# Patient Record
Sex: FEMALE | Race: WHITE | NOT HISPANIC OR LATINO | URBAN - METROPOLITAN AREA
[De-identification: names, ages, dates, MRNs, and addresses within clinical notes are randomized per-mention and may not be internally consistent; named-entity substitution may affect disease eponyms.]

---

## 2023-09-26 VITALS
SYSTOLIC BLOOD PRESSURE: 121 MMHG | RESPIRATION RATE: 11 BRPM | HEART RATE: 90 BPM | RESPIRATION RATE: 11 BRPM | HEART RATE: 77 BPM | DIASTOLIC BLOOD PRESSURE: 81 MMHG | SYSTOLIC BLOOD PRESSURE: 109 MMHG | OXYGEN SATURATION: 99 % | HEART RATE: 89 BPM | OXYGEN SATURATION: 98 % | RESPIRATION RATE: 11 BRPM | DIASTOLIC BLOOD PRESSURE: 70 MMHG | DIASTOLIC BLOOD PRESSURE: 73 MMHG | HEIGHT: 71 IN | HEART RATE: 75 BPM | DIASTOLIC BLOOD PRESSURE: 79 MMHG | SYSTOLIC BLOOD PRESSURE: 103 MMHG | SYSTOLIC BLOOD PRESSURE: 165 MMHG | SYSTOLIC BLOOD PRESSURE: 105 MMHG | SYSTOLIC BLOOD PRESSURE: 119 MMHG | SYSTOLIC BLOOD PRESSURE: 121 MMHG | SYSTOLIC BLOOD PRESSURE: 109 MMHG | DIASTOLIC BLOOD PRESSURE: 66 MMHG | RESPIRATION RATE: 17 BRPM | RESPIRATION RATE: 16 BRPM | DIASTOLIC BLOOD PRESSURE: 61 MMHG | HEART RATE: 69 BPM | DIASTOLIC BLOOD PRESSURE: 69 MMHG | HEART RATE: 89 BPM | DIASTOLIC BLOOD PRESSURE: 56 MMHG | SYSTOLIC BLOOD PRESSURE: 133 MMHG | HEART RATE: 81 BPM | RESPIRATION RATE: 7 BRPM | OXYGEN SATURATION: 97 % | HEART RATE: 89 BPM | RESPIRATION RATE: 7 BRPM | HEART RATE: 77 BPM | WEIGHT: 150 LBS | DIASTOLIC BLOOD PRESSURE: 83 MMHG | SYSTOLIC BLOOD PRESSURE: 101 MMHG | SYSTOLIC BLOOD PRESSURE: 104 MMHG | OXYGEN SATURATION: 100 % | RESPIRATION RATE: 17 BRPM | HEART RATE: 62 BPM | HEART RATE: 87 BPM | SYSTOLIC BLOOD PRESSURE: 123 MMHG | SYSTOLIC BLOOD PRESSURE: 119 MMHG | HEART RATE: 58 BPM | HEART RATE: 58 BPM | DIASTOLIC BLOOD PRESSURE: 101 MMHG | HEART RATE: 81 BPM | OXYGEN SATURATION: 98 % | OXYGEN SATURATION: 100 % | DIASTOLIC BLOOD PRESSURE: 65 MMHG | HEART RATE: 58 BPM | HEART RATE: 90 BPM | HEART RATE: 92 BPM | HEART RATE: 79 BPM | HEART RATE: 78 BPM | DIASTOLIC BLOOD PRESSURE: 101 MMHG | HEIGHT: 71 IN | DIASTOLIC BLOOD PRESSURE: 83 MMHG | SYSTOLIC BLOOD PRESSURE: 123 MMHG | DIASTOLIC BLOOD PRESSURE: 84 MMHG | RESPIRATION RATE: 9 BRPM | HEART RATE: 78 BPM | RESPIRATION RATE: 10 BRPM | HEART RATE: 80 BPM | DIASTOLIC BLOOD PRESSURE: 83 MMHG | SYSTOLIC BLOOD PRESSURE: 119 MMHG | RESPIRATION RATE: 5 BRPM | SYSTOLIC BLOOD PRESSURE: 109 MMHG | SYSTOLIC BLOOD PRESSURE: 104 MMHG | DIASTOLIC BLOOD PRESSURE: 73 MMHG | HEART RATE: 75 BPM | RESPIRATION RATE: 17 BRPM | DIASTOLIC BLOOD PRESSURE: 57 MMHG | DIASTOLIC BLOOD PRESSURE: 73 MMHG | HEART RATE: 62 BPM | HEART RATE: 78 BPM | DIASTOLIC BLOOD PRESSURE: 84 MMHG | DIASTOLIC BLOOD PRESSURE: 57 MMHG | DIASTOLIC BLOOD PRESSURE: 61 MMHG | DIASTOLIC BLOOD PRESSURE: 79 MMHG | HEART RATE: 79 BPM | HEART RATE: 80 BPM | HEART RATE: 92 BPM | DIASTOLIC BLOOD PRESSURE: 101 MMHG | SYSTOLIC BLOOD PRESSURE: 105 MMHG | DIASTOLIC BLOOD PRESSURE: 61 MMHG | HEART RATE: 75 BPM | DIASTOLIC BLOOD PRESSURE: 81 MMHG | HEART RATE: 87 BPM | HEART RATE: 76 BPM | RESPIRATION RATE: 16 BRPM | WEIGHT: 150 LBS | RESPIRATION RATE: 10 BRPM | RESPIRATION RATE: 10 BRPM | DIASTOLIC BLOOD PRESSURE: 58 MMHG | DIASTOLIC BLOOD PRESSURE: 56 MMHG | RESPIRATION RATE: 1 BRPM | SYSTOLIC BLOOD PRESSURE: 165 MMHG | SYSTOLIC BLOOD PRESSURE: 105 MMHG | HEART RATE: 92 BPM | RESPIRATION RATE: 5 BRPM | DIASTOLIC BLOOD PRESSURE: 79 MMHG | OXYGEN SATURATION: 99 % | DIASTOLIC BLOOD PRESSURE: 57 MMHG | SYSTOLIC BLOOD PRESSURE: 129 MMHG | RESPIRATION RATE: 1 BRPM | OXYGEN SATURATION: 100 % | SYSTOLIC BLOOD PRESSURE: 129 MMHG | SYSTOLIC BLOOD PRESSURE: 133 MMHG | RESPIRATION RATE: 16 BRPM | HEART RATE: 69 BPM | HEART RATE: 90 BPM | HEART RATE: 87 BPM | HEIGHT: 71 IN | SYSTOLIC BLOOD PRESSURE: 101 MMHG | OXYGEN SATURATION: 99 % | HEART RATE: 62 BPM | DIASTOLIC BLOOD PRESSURE: 65 MMHG | DIASTOLIC BLOOD PRESSURE: 58 MMHG | RESPIRATION RATE: 5 BRPM | SYSTOLIC BLOOD PRESSURE: 165 MMHG | HEART RATE: 77 BPM | SYSTOLIC BLOOD PRESSURE: 103 MMHG | HEART RATE: 80 BPM | SYSTOLIC BLOOD PRESSURE: 103 MMHG | DIASTOLIC BLOOD PRESSURE: 84 MMHG | HEART RATE: 81 BPM | OXYGEN SATURATION: 97 % | DIASTOLIC BLOOD PRESSURE: 70 MMHG | DIASTOLIC BLOOD PRESSURE: 69 MMHG | OXYGEN SATURATION: 98 % | OXYGEN SATURATION: 97 % | RESPIRATION RATE: 9 BRPM | DIASTOLIC BLOOD PRESSURE: 66 MMHG | DIASTOLIC BLOOD PRESSURE: 66 MMHG | SYSTOLIC BLOOD PRESSURE: 121 MMHG | SYSTOLIC BLOOD PRESSURE: 123 MMHG | HEART RATE: 76 BPM | SYSTOLIC BLOOD PRESSURE: 132 MMHG | DIASTOLIC BLOOD PRESSURE: 58 MMHG | HEART RATE: 76 BPM | SYSTOLIC BLOOD PRESSURE: 104 MMHG | WEIGHT: 150 LBS | DIASTOLIC BLOOD PRESSURE: 81 MMHG | SYSTOLIC BLOOD PRESSURE: 132 MMHG | TEMPERATURE: 98 F | DIASTOLIC BLOOD PRESSURE: 56 MMHG | SYSTOLIC BLOOD PRESSURE: 132 MMHG | RESPIRATION RATE: 9 BRPM | DIASTOLIC BLOOD PRESSURE: 70 MMHG | SYSTOLIC BLOOD PRESSURE: 133 MMHG | HEART RATE: 79 BPM | TEMPERATURE: 98 F | SYSTOLIC BLOOD PRESSURE: 101 MMHG | RESPIRATION RATE: 7 BRPM | HEART RATE: 69 BPM | DIASTOLIC BLOOD PRESSURE: 69 MMHG | DIASTOLIC BLOOD PRESSURE: 65 MMHG | TEMPERATURE: 98 F | RESPIRATION RATE: 1 BRPM | SYSTOLIC BLOOD PRESSURE: 129 MMHG

## 2023-09-29 ENCOUNTER — OFFICE (OUTPATIENT)
Dept: URBAN - METROPOLITAN AREA PATHOLOGY 2 | Facility: PATHOLOGY | Age: 48
End: 2023-09-29
Payer: COMMERCIAL

## 2023-09-29 ENCOUNTER — AMBULATORY SURGICAL CENTER (OUTPATIENT)
Dept: URBAN - METROPOLITAN AREA SURGERY 12 | Facility: SURGERY | Age: 48
End: 2023-09-29

## 2023-09-29 DIAGNOSIS — K64.8 OTHER HEMORRHOIDS: ICD-10-CM

## 2023-09-29 DIAGNOSIS — Z86.010 PERSONAL HISTORY OF COLONIC POLYPS: ICD-10-CM

## 2023-09-29 DIAGNOSIS — Z09 ENCOUNTER FOR FOLLOW-UP EXAMINATION AFTER COMPLETED TREATMEN: ICD-10-CM

## 2023-09-29 DIAGNOSIS — D12.8 BENIGN NEOPLASM OF RECTUM: ICD-10-CM

## 2023-09-29 PROBLEM — K62.1 RECTAL POLYP: Status: ACTIVE | Noted: 2023-09-29

## 2023-09-29 PROCEDURE — 88305 TISSUE EXAM BY PATHOLOGIST: CPT | Performed by: PATHOLOGY

## 2023-09-29 PROCEDURE — 45380 COLONOSCOPY AND BIOPSY: CPT | Performed by: INTERNAL MEDICINE

## 2023-10-23 NOTE — PROGRESS NOTES
HPI    Nancy Dalton is a 48 y.o. female who previously underwent TAE of a rectal polyp with Dr. Currie on 3/8/2022. Path demonstrating TA. She most recently underwent colonoscopy on 9/29/23 with Dr. Hunter. There was evidence of a distal rectal polyp adjacent to the dentate line, biopsied. Path TA.     She has an external hemorrhoid that causes her pain. She has intermittent constipation. No urgency or incontinence. No bleeding. Her hemorrhoid has been bothering her since the colonoscopy. She has discomfort and difficulties with hygiene because of the hemorrhoid. She moves her bowels 1-2 times per day. She does not strain. Spends minimal time on the toilet.     Colonoscopy 9/29/23 ([facility]): Complete to the cecum. There was evidence of a distal rectal polyp adjacent to the dentate line, biopsied. Path TA. There was evidence of scarring in the distal rectum from prior polypectomy. Internal hemorrhoids were noted.     Non-smoker/No ETOH/No Illicit drug use  No family history of CRC or IBD    Past Medical History:   Diagnosis Date    Personal history of diseases of the blood and blood-forming organs and certain disorders involving the immune mechanism 02/11/2022    History of anemia       Past Surgical History:   Procedure Laterality Date    OTHER SURGICAL HISTORY  02/11/2022    Tonsillectomy    OTHER SURGICAL HISTORY  09/02/2022    Rectal polypectomy       Allergies: Not on File        Physical Exam  Resolving R sided thrombosed external hemorrhoid  FRANCY tone normal, squeeze normal    Anoscopy    Date/Time: 10/26/2023 11:26 AM    Performed by: Gisell Viramontes MD  Authorized by: Gisell Viramontes MD    Consent:     Consent obtained:  Verbal    Consent given by:  Patient    Risks, benefits, and alternatives were discussed: yes      Risks discussed:  Bleeding and pain  Universal protocol:     Procedure explained and questions answered to patient or proxy's satisfaction: yes      Patient identity  confirmed:  Verbally with patient  Post-procedure details:     Procedure completion:  Tolerated well, no immediate complications  Comments:      Resolving thrombosed external right lateral hemorrhoid. Right anterolateral rectal polyp.       Assessment and Plan:     Recurrent R anterior polyp at dentate line; on my exam, it is about 4-5mm in size. Probably a recurrence given location.   Resolving external thrombosed hemorroid on same side.   Plan for transanal excision.     The diagnosis, anatomy, pathophysiology, and staging of polyps, cancer in a polyp and colorectal cancer were discussed. We discussed the findings, indications for surgery, treatment goals, procedure of transanal excision and alternatives. We discussed the perioperative course including preparation, avoidance of coagulopathy inducing medications, hospital arrival, procedure details, discharge and recovery.     We discussed the risks specific to TAE including, but were not limited to, inability to remove the lesion, bleeding, infection, wound separation, peritonitis (abdominal infection), stenosis (narrowing of the rectum), sphincter weakness and incontinence, fistula to the skin (or vagina in women), difficulty urinating after the procedure, and recurrence of the lesion. Further treatment recommendations will be based on the final pathology. The general risks of surgery discussed included, but were not limited to, infection (abdomen, wound, bladder, lungs, etc), transfusion (hepatitis, HIV), deep vein thrombosis-DVT (leg clots), pulmonary embolism-PE (lung clots), hernia, heart problems (heart attack, CHF & arrhythmia), lung problems (pneumonia, aspiration, atelectasis), stroke.     We also talked about the hemorrhoid - I think its worth waiting until it resolves, and if she has a residual skin tag that still bothers her and is difficult to clean, we could excise at hte same time. This would significantly add to the postop pain and recovery. We  talked about this in detail. If it is not substantially better in 3 weeks, I asked her to call me. I recommend fiber, fluid, and heat in the meantime.     Patient education materials were provided. All questions were answered. Informed consent was obtained.

## 2023-10-25 PROBLEM — D64.9 ANEMIA: Status: ACTIVE | Noted: 2023-10-25

## 2023-10-25 PROBLEM — A69.20 LYME DISEASE: Status: ACTIVE | Noted: 2023-10-25

## 2023-10-25 PROBLEM — K63.5 COLON POLYP: Status: ACTIVE | Noted: 2023-10-25

## 2023-10-25 RX ORDER — ERGOCALCIFEROL 1.25 MG/1
1 CAPSULE ORAL
COMMUNITY
Start: 2022-10-26 | End: 2023-10-26 | Stop reason: ALTCHOICE

## 2023-10-25 RX ORDER — FERROUS SULFATE 325(65) MG
TABLET ORAL
COMMUNITY

## 2023-10-25 RX ORDER — ACETAMINOPHEN 500 MG
TABLET ORAL
COMMUNITY

## 2023-10-26 ENCOUNTER — OFFICE VISIT (OUTPATIENT)
Dept: SURGERY | Facility: CLINIC | Age: 48
End: 2023-10-26
Payer: COMMERCIAL

## 2023-10-26 ENCOUNTER — PREP FOR PROCEDURE (OUTPATIENT)
Dept: GYNECOLOGIC ONCOLOGY | Facility: HOSPITAL | Age: 48
End: 2023-10-26

## 2023-10-26 VITALS
WEIGHT: 146 LBS | HEART RATE: 128 BPM | SYSTOLIC BLOOD PRESSURE: 139 MMHG | BODY MASS INDEX: 20.44 KG/M2 | DIASTOLIC BLOOD PRESSURE: 96 MMHG | HEIGHT: 71 IN

## 2023-10-26 DIAGNOSIS — K62.1 RECTAL POLYP: Primary | ICD-10-CM

## 2023-10-26 PROCEDURE — 99214 OFFICE O/P EST MOD 30 MIN: CPT | Performed by: SURGERY

## 2023-10-26 PROCEDURE — 46600 DIAGNOSTIC ANOSCOPY SPX: CPT | Performed by: SURGERY

## 2023-10-27 ENCOUNTER — APPOINTMENT (OUTPATIENT)
Dept: SURGERY | Facility: CLINIC | Age: 48
End: 2023-10-27
Payer: COMMERCIAL

## 2023-12-01 ENCOUNTER — OFFICE (OUTPATIENT)
Dept: URBAN - METROPOLITAN AREA CLINIC 27 | Facility: CLINIC | Age: 48
End: 2023-12-01

## 2023-12-01 VITALS — WEIGHT: 147 LBS | HEIGHT: 71 IN | TEMPERATURE: 97.7 F

## 2023-12-01 DIAGNOSIS — Z09 ENCOUNTER FOR FOLLOW-UP EXAMINATION AFTER COMPLETED TREATMEN: ICD-10-CM

## 2023-12-01 DIAGNOSIS — Z86.010 PERSONAL HISTORY OF COLONIC POLYPS: ICD-10-CM

## 2023-12-01 PROCEDURE — 99213 OFFICE O/P EST LOW 20 MIN: CPT | Performed by: INTERNAL MEDICINE

## 2024-01-08 NOTE — CPM/PAT H&P
CPM/PAT Evaluation       Name: Nancy Dalton (Nancy Dalton)  /Age: 1975/48 y.o.     TELEMEDICINE ENCOUNTER  Patient was contacted by telephone for preadmission testing perioperative risk assessment prior to surgery.    CHIEF COMPLAINT  Rectal polyp    HPI  Nacny Dalton is a 48-year-old female complaining of rectal polyp that was visualized under colonoscopy on 2023.  Patient with prior history of rectal polyp underwent TAE on 2022 with Dr. Currie.  Patient also states she has an external hemorrhoid that is tender/painful.  She denies any associated bleeding, and endorses intermittent constipation.  Patient scheduled for excision of rectum lesion on 2024 at [facility].     ACTIVE PROBLEMS  Patient Active Problem List   Diagnosis    Anemia    Colon polyp    Lyme disease    Rectal polyp     PAST MEDICAL HISTORY  Past Medical History:   Diagnosis Date    Personal history of diseases of the blood and blood-forming organs and certain disorders involving the immune mechanism 2022    History of anemia     SURGICAL HISTORY  Past Surgical History:   Procedure Laterality Date    COLONOSCOPY      OTHER SURGICAL HISTORY  2022    Tonsillectomy    OTHER SURGICAL HISTORY  2022    Rectal polypectomy    UPPER GASTROINTESTINAL ENDOSCOPY      WISDOM TOOTH EXTRACTION       ANESTHESIA HISTORY  Patient states she had PONV after tonsillectomy surgery as a child, but denies any problems with adult anesthesia such as prolonged sedation, awareness, dental damage, aspiration, cardiac arrest, difficult intubation, or unexpected hospital admissions  Denies family history of malignant hyperthermia, or pseudocholinesterase deficiency.    SOCIAL HISTORY  Never smoker; EtOH: About 2 to 3 glasses of wine a week; denies recreational drug use  Patient states she exercises 2-3 times a week, and is able to do moderate ADLs such as heavy housework, light yard work.  Patient denies history of chest  pain, GALLOWAY.  METS >4    FAMILY HISTORY  Family History   Problem Relation Name Age of Onset    Other (malignant neoplasm) Mother      Kidney disease Father       ALLERGIES  Allergies   Allergen Reactions    Erythromycin Unknown    Penicillin Hives and Rash     MEDICATIONS  No current facility-administered medications for this encounter.    Current Outpatient Medications:     cholecalciferol (Vitamin D-3) 50 mcg (2,000 unit) capsule, Take by mouth., Disp: , Rfl:     ferrous sulfate 325 (65 Fe) MG tablet, Take by mouth., Disp: , Rfl:     PHYSICAL EXAM  Deferred    AIRWAY EXAM  Deferred    VITALS  No vitals taken for telemedicine visit  Height: 5 feet 11 inches; weight: 146 pounds; BMI: 20.36    LABS  Recent Data from [facility]  Related to CBC + DIFF  Component 10/20/23 04/24/23 10/07/21 09/29/20 08/03/20 01/17/20   WBC 4.6 5.0 5.76 4.39 3.60 Low  5.70   RBC 4.17 4.63 4.27 3.98 4.40 4.38   Hemoglobin 12.4 11.4 10.9 Low  10.8 Low  11.9 11.1 Low    Hematocrit 36.5 36.2 37.0 34.1 Low  38.4 36.7   MCV 88 78 Low  86.7 85.7 87.3 83.8   MCH 29.7 24.6 Low  25.5 Low  27.1 27.0 25.3 Low    MCHC 34.0 31.5 29.5 Low  31.7 31.0 30.2 Low    RDW-CV 13.5 13.4 14.3 13.2 13.6 15.1 High    Platelet Count 336 364 320 316 318 359   Neut% 52 46 60.4 48.7 40.5 55.5   Lymph% 36 41 26.2 40.1 48.9 31.9   Mono% 7 7 9.9 7.3 5.8 7.5   Eosin% 4 5 2.8 3.2 4.2 4.0   Baso% 1 1 0.7 0.7 0.6 1.1   Neutrophil Ab 2.4 2.3 -- -- -- --   Lymphocytes, Absolute 1.7 2.1 -- -- -- --   Abs Mono 0.3 0.3 0.57 0.32 0.21 0.43   Abs Eosin 0.2 0.2 0.16 0.14 0.15 0.23   Abs Baso 0.0 0.0 0.04 0.03 <0.03 0.06   Immature Granulocytes 0 0 -- -- -- --   IMMATURE GRANS (ABS) 0.0 0.0 -- -- -- --        Recent Data from [facility]  Related to COMP METABOLIC PANEL  Component 10/20/23 10/07/21 08/03/20 05/09/18 12/10/05 11/29/05   Glucose 88 92  91  90  119 High  --   BUN 15 11 12 12 15 --   Creatinine 0.91 0.73 0.81 0.75 0.80 0.70   EGFR 77 -- -- -- -- --   BUN/Creat  Ratio 16 -- -- -- -- --   Sodium 139 141 140 139 133 Low  --   Potassium 4.3 4.4 5.0 4.3 3.9 --   Chloride 101 109 High  105 101 97 Low  --   CO2 24 23 24 26 22 --   Calcium 9.4 9.0 9.5 9.3 8.3 Low  --   Protein, Total 6.7 6.7 6.9 7.0 -- --   Albumin 4.9 4.3 4.6 4.6 -- --   Globulin, Total 1.8 -- -- -- -- --   A/G Ratio (Electro) 2.7 High  -- -- -- -- --   Bilirubin, Total 1.4 High  0.7 1.1 1.6 High  -- --   Alkaline Phosphatase 44 47 41 41 -- --   AST 13 18 16 16 -- --   ALT 10 10 8 10 -- --   Anion Gap -- 9 11 12 14.0 --   eGFR- -- >60 >60 >60 -- --   eGFR-All Other Races -- >60  >60  >60  -- --   View all related data       IMAGING  EKG 10/24/2022  Impression    NSR, NL Axis, normal Pwave morphology, Q waves III and F, possible inferior infarct age undetermined. No acute ST-T changes and no prior injury. Abnormal EKG  Specimen Collected: -- Last Resulted: 10/24/22   Received From: [facility]  Result Received: 10/26/23 11:08        ASSESSMENT/PLAN  Rectal polyp  Excision of rectum lesion      This note was created in part upon personal review of patient's medical records.

## 2024-01-08 NOTE — PREPROCEDURE INSTRUCTIONS
Pre-Op Instructions & Checklist  Your surgery has been scheduled at [facility] at [address] [place], [address], [address], in the [facility]. Parking is to the left of the main entrance.  You will be contacted about the time of your surgery the day before your surgery. If you are unable to answer the phone, a detailed voicemail message will be left. Make sure that your voicemail box is not full so a message can be left. If you have not received a call by 3:00 pm you may call [phone] between the hours of 3:00 and 4:00 pm. Please be available by phone the night before/day of surgery in case there is a change in the schedule which may require you to arrive earlier/later.  14 DAYS BEFORE SURGERY STOP TAKING WEIGHT LOSS MEDICATIONS     7 DAYS BEFORE SURGERY STOP THESE MEDICATIONS:  Multiple Vitamins containing Vitamin E  Herbal supplements, Fish Oil, garlic pills, turmeric, CoQ enzyme  Stop taking aspirin, and aspirin-containing products as well as NSAID's such as Advil, Motrin, Aleve, Ibuprofen. Tylenol is okay to take for pain relief.   If you are currently taking Coumadin/Warfarin, we will have to coordinate that with your PCP &/or the Anticoagulation Clinic.       THE DAY BEFORE SURGERY:  *Do not eat any food after midnight the night before surgery.   *You are permitted to have clear liquids such as water, apple juice, plain tea or coffee (no milk or creamer), clear electrolyte-replenishing drinks such as Pedialyte, Gatorade, or Powerade (not yogurt or pulp-containing smoothies or juices such as orange juice) up to 2 hours before your surgery.    DAY OF SURGERY, TAKE THESE MEDICATIONS with a small sip of water (if it is not listed, do not take it):    There are no medications for you to take the morning of surgery.     ON THE MORNING OF SURGERY:  *Shower either the night before your surgery or the morning of your surgery  *Do not use moisturizers, creams, lotions or perfume,  or make-up.  *Wear comfortable, loose fitting clothing.   *All jewelry and valuables should be left at home.  *Prosthetic devices such as contact lenses, hearing aids, dentures, eyelash extensions, hairpins and body piercing must be removed before surgery. Bring containers for eyeglasses/contacts, dentures, or hearing aids with you.  Diabetics: Please check fasting blood sugars upon waking up.  If fasting blood sugars are<80ml/dl, please drink 3 ounces of apple juice no later than 2 hours prior to surgery.    BRING WITH YOU:  *Photo ID and insurance card  *Current list of medicines and allergies  *Pacemaker/Defibrillator/Heart stent cards  *Copy of your complete Advanced Directive/DHPOA-if applicable      SMOKING:  *Quitting smoking can make a huge difference to your health and recovery from surgery.    *If you need help with quitting, call 1-252-QUIT-NOW.    Alcohol:  *No alcoholic beverages for 48 hours before surgery.    AFTER OUTPATIENT SURGERY:  *A responsible adult MUST accompany you at the time of discharge and stay with you for 24 hours after your surgery.  *You may NOT drive yourself home after surgery.  *You may use a taxi or ride sharing service (Lyft, Uber) to return home ONLY if you are accompanied by a friend or family member.  *Instructions for resuming your medications will be provided by your surgeon.    CONTACT SURGEON'S OFFICE IF YOU DEVELOP:  * Fever =/> 100.4 F   * New respiratory symptoms (e.g. cough, shortness of breath, respiratory distress, sore throat)  * Recent loss of taste or smell  *Flu like symptoms such as headache, fatigue or gastrointestinal symptoms  * If you develop any open sores, shingles, burning or painful urination   AND/OR:  * You no longer wish to have the surgery.  * Any other personal circumstances change that may lead to the need to cancel or defer this surgery.  *You were admitted to any hospital within one week of your planned procedure.      If you have any questions  regarding these preoperative instructions you may call [phone]. If you have questions regarding you surgical procedure, or post-operative care/recovery please call your surgeon's office.     Link to [facility]  [email or web address]

## 2024-01-18 ENCOUNTER — ANESTHESIA EVENT (OUTPATIENT)
Dept: OPERATING ROOM | Facility: CLINIC | Age: 49
End: 2024-01-18
Payer: COMMERCIAL

## 2024-01-19 ENCOUNTER — ANESTHESIA (OUTPATIENT)
Dept: OPERATING ROOM | Facility: CLINIC | Age: 49
End: 2024-01-19
Payer: COMMERCIAL

## 2024-01-19 ENCOUNTER — HOSPITAL ENCOUNTER (OUTPATIENT)
Facility: CLINIC | Age: 49
Setting detail: OUTPATIENT SURGERY
Discharge: HOME | End: 2024-01-19
Attending: SURGERY | Admitting: SURGERY
Payer: COMMERCIAL

## 2024-01-19 VITALS
OXYGEN SATURATION: 96 % | WEIGHT: 148.15 LBS | BODY MASS INDEX: 20.74 KG/M2 | HEART RATE: 72 BPM | RESPIRATION RATE: 16 BRPM | DIASTOLIC BLOOD PRESSURE: 93 MMHG | SYSTOLIC BLOOD PRESSURE: 161 MMHG | HEIGHT: 71 IN | TEMPERATURE: 97.9 F

## 2024-01-19 DIAGNOSIS — K62.1 RECTAL POLYP: Primary | ICD-10-CM

## 2024-01-19 PROCEDURE — 2500000004 HC RX 250 GENERAL PHARMACY W/ HCPCS (ALT 636 FOR OP/ED): Performed by: ANESTHESIOLOGY

## 2024-01-19 PROCEDURE — A4217 STERILE WATER/SALINE, 500 ML: HCPCS | Performed by: SURGERY

## 2024-01-19 PROCEDURE — A45172 PR EXCIS RECTAL TUMOR, TRANSANAL, FULL THICKNESS: Performed by: REGISTERED NURSE

## 2024-01-19 PROCEDURE — 7100000001 HC RECOVERY ROOM TIME - INITIAL BASE CHARGE: Performed by: SURGERY

## 2024-01-19 PROCEDURE — A45172 PR EXCIS RECTAL TUMOR, TRANSANAL, FULL THICKNESS: Performed by: ANESTHESIOLOGY

## 2024-01-19 PROCEDURE — 3600000003 HC OR TIME - INITIAL BASE CHARGE - PROCEDURE LEVEL THREE: Performed by: SURGERY

## 2024-01-19 PROCEDURE — 2500000005 HC RX 250 GENERAL PHARMACY W/O HCPCS: Performed by: REGISTERED NURSE

## 2024-01-19 PROCEDURE — 2500000005 HC RX 250 GENERAL PHARMACY W/O HCPCS: Performed by: SURGERY

## 2024-01-19 PROCEDURE — 45172 EXC RECT TUM TRANSANAL FULL: CPT | Performed by: SURGERY

## 2024-01-19 PROCEDURE — 2500000004 HC RX 250 GENERAL PHARMACY W/ HCPCS (ALT 636 FOR OP/ED): Performed by: STUDENT IN AN ORGANIZED HEALTH CARE EDUCATION/TRAINING PROGRAM

## 2024-01-19 PROCEDURE — 2500000004 HC RX 250 GENERAL PHARMACY W/ HCPCS (ALT 636 FOR OP/ED): Performed by: SURGERY

## 2024-01-19 PROCEDURE — 7100000009 HC PHASE TWO TIME - INITIAL BASE CHARGE: Performed by: SURGERY

## 2024-01-19 PROCEDURE — 3700000001 HC GENERAL ANESTHESIA TIME - INITIAL BASE CHARGE: Performed by: SURGERY

## 2024-01-19 PROCEDURE — 3700000002 HC GENERAL ANESTHESIA TIME - EACH INCREMENTAL 1 MINUTE: Performed by: SURGERY

## 2024-01-19 PROCEDURE — 2500000004 HC RX 250 GENERAL PHARMACY W/ HCPCS (ALT 636 FOR OP/ED): Performed by: REGISTERED NURSE

## 2024-01-19 PROCEDURE — 88305 TISSUE EXAM BY PATHOLOGIST: CPT | Performed by: STUDENT IN AN ORGANIZED HEALTH CARE EDUCATION/TRAINING PROGRAM

## 2024-01-19 PROCEDURE — 7100000002 HC RECOVERY ROOM TIME - EACH INCREMENTAL 1 MINUTE: Performed by: SURGERY

## 2024-01-19 PROCEDURE — 3600000008 HC OR TIME - EACH INCREMENTAL 1 MINUTE - PROCEDURE LEVEL THREE: Performed by: SURGERY

## 2024-01-19 PROCEDURE — 88305 TISSUE EXAM BY PATHOLOGIST: CPT | Mod: TC,SUR | Performed by: SURGERY

## 2024-01-19 PROCEDURE — 7100000010 HC PHASE TWO TIME - EACH INCREMENTAL 1 MINUTE: Performed by: SURGERY

## 2024-01-19 RX ORDER — SODIUM CHLORIDE, SODIUM LACTATE, POTASSIUM CHLORIDE, CALCIUM CHLORIDE 600; 310; 30; 20 MG/100ML; MG/100ML; MG/100ML; MG/100ML
100 INJECTION, SOLUTION INTRAVENOUS CONTINUOUS
Status: DISCONTINUED | OUTPATIENT
Start: 2024-01-19 | End: 2024-01-19 | Stop reason: HOSPADM

## 2024-01-19 RX ORDER — ALBUTEROL SULFATE 0.83 MG/ML
2.5 SOLUTION RESPIRATORY (INHALATION) ONCE AS NEEDED
Status: DISCONTINUED | OUTPATIENT
Start: 2024-01-19 | End: 2024-01-19 | Stop reason: HOSPADM

## 2024-01-19 RX ORDER — ACETAMINOPHEN 325 MG/1
650 TABLET ORAL EVERY 4 HOURS PRN
Status: DISCONTINUED | OUTPATIENT
Start: 2024-01-19 | End: 2024-01-19 | Stop reason: HOSPADM

## 2024-01-19 RX ORDER — FENTANYL CITRATE 50 UG/ML
25 INJECTION, SOLUTION INTRAMUSCULAR; INTRAVENOUS EVERY 5 MIN PRN
Status: DISCONTINUED | OUTPATIENT
Start: 2024-01-19 | End: 2024-01-19 | Stop reason: HOSPADM

## 2024-01-19 RX ORDER — HYDRALAZINE HYDROCHLORIDE 20 MG/ML
5 INJECTION INTRAMUSCULAR; INTRAVENOUS EVERY 30 MIN PRN
Status: DISCONTINUED | OUTPATIENT
Start: 2024-01-19 | End: 2024-01-19 | Stop reason: HOSPADM

## 2024-01-19 RX ORDER — FENTANYL CITRATE 50 UG/ML
50 INJECTION, SOLUTION INTRAMUSCULAR; INTRAVENOUS EVERY 5 MIN PRN
Status: DISCONTINUED | OUTPATIENT
Start: 2024-01-19 | End: 2024-01-19 | Stop reason: HOSPADM

## 2024-01-19 RX ORDER — SODIUM CHLORIDE 0.9 G/100ML
IRRIGANT IRRIGATION AS NEEDED
Status: DISCONTINUED | OUTPATIENT
Start: 2024-01-19 | End: 2024-01-19 | Stop reason: HOSPADM

## 2024-01-19 RX ORDER — ROCURONIUM BROMIDE 10 MG/ML
INJECTION, SOLUTION INTRAVENOUS AS NEEDED
Status: DISCONTINUED | OUTPATIENT
Start: 2024-01-19 | End: 2024-01-19

## 2024-01-19 RX ORDER — LIDOCAINE HYDROCHLORIDE 20 MG/ML
INJECTION, SOLUTION INFILTRATION; PERINEURAL AS NEEDED
Status: DISCONTINUED | OUTPATIENT
Start: 2024-01-19 | End: 2024-01-19

## 2024-01-19 RX ORDER — ONDANSETRON HYDROCHLORIDE 2 MG/ML
4 INJECTION, SOLUTION INTRAVENOUS ONCE AS NEEDED
Status: DISCONTINUED | OUTPATIENT
Start: 2024-01-19 | End: 2024-01-19 | Stop reason: HOSPADM

## 2024-01-19 RX ORDER — OXYCODONE HYDROCHLORIDE 5 MG/1
5 TABLET ORAL EVERY 6 HOURS PRN
Qty: 15 TABLET | Refills: 0 | Status: SHIPPED | OUTPATIENT
Start: 2024-01-19 | End: 2024-01-24

## 2024-01-19 RX ORDER — ONDANSETRON HYDROCHLORIDE 2 MG/ML
INJECTION, SOLUTION INTRAVENOUS AS NEEDED
Status: DISCONTINUED | OUTPATIENT
Start: 2024-01-19 | End: 2024-01-19

## 2024-01-19 RX ORDER — PROPOFOL 10 MG/ML
INJECTION, EMULSION INTRAVENOUS AS NEEDED
Status: DISCONTINUED | OUTPATIENT
Start: 2024-01-19 | End: 2024-01-19

## 2024-01-19 RX ORDER — KETOROLAC TROMETHAMINE 30 MG/ML
INJECTION, SOLUTION INTRAMUSCULAR; INTRAVENOUS AS NEEDED
Status: DISCONTINUED | OUTPATIENT
Start: 2024-01-19 | End: 2024-01-19

## 2024-01-19 RX ORDER — DEXAMETHASONE SODIUM PHOSPHATE 4 MG/ML
INJECTION, SOLUTION INTRA-ARTICULAR; INTRALESIONAL; INTRAMUSCULAR; INTRAVENOUS; SOFT TISSUE AS NEEDED
Status: DISCONTINUED | OUTPATIENT
Start: 2024-01-19 | End: 2024-01-19

## 2024-01-19 RX ORDER — FENTANYL CITRATE 50 UG/ML
INJECTION, SOLUTION INTRAMUSCULAR; INTRAVENOUS AS NEEDED
Status: DISCONTINUED | OUTPATIENT
Start: 2024-01-19 | End: 2024-01-19

## 2024-01-19 RX ORDER — BUPIVACAINE HCL/EPINEPHRINE 0.5-1:200K
VIAL (ML) INJECTION AS NEEDED
Status: DISCONTINUED | OUTPATIENT
Start: 2024-01-19 | End: 2024-01-19 | Stop reason: HOSPADM

## 2024-01-19 RX ORDER — MIDAZOLAM HYDROCHLORIDE 1 MG/ML
INJECTION, SOLUTION INTRAMUSCULAR; INTRAVENOUS AS NEEDED
Status: DISCONTINUED | OUTPATIENT
Start: 2024-01-19 | End: 2024-01-19

## 2024-01-19 RX ORDER — OXYCODONE AND ACETAMINOPHEN 5; 325 MG/1; MG/1
1 TABLET ORAL EVERY 4 HOURS PRN
Status: DISCONTINUED | OUTPATIENT
Start: 2024-01-19 | End: 2024-01-19 | Stop reason: HOSPADM

## 2024-01-19 RX ORDER — METRONIDAZOLE 250 MG/1
250 TABLET ORAL 3 TIMES DAILY
Qty: 21 TABLET | Refills: 0 | Status: SHIPPED | OUTPATIENT
Start: 2024-01-19 | End: 2024-01-26

## 2024-01-19 RX ORDER — LABETALOL HYDROCHLORIDE 5 MG/ML
5 INJECTION, SOLUTION INTRAVENOUS ONCE
Status: COMPLETED | OUTPATIENT
Start: 2024-01-19 | End: 2024-01-19

## 2024-01-19 RX ADMIN — LABETALOL HYDROCHLORIDE 5 MG: 5 INJECTION, SOLUTION INTRAVENOUS at 10:30

## 2024-01-19 RX ADMIN — KETOROLAC TROMETHAMINE 30 MG: 30 INJECTION, SOLUTION INTRAMUSCULAR at 08:27

## 2024-01-19 RX ADMIN — SUGAMMADEX 200 MG: 100 INJECTION, SOLUTION INTRAVENOUS at 08:33

## 2024-01-19 RX ADMIN — FENTANYL CITRATE 50 MCG: 50 INJECTION, SOLUTION INTRAMUSCULAR; INTRAVENOUS at 07:48

## 2024-01-19 RX ADMIN — MIDAZOLAM 2 MG: 1 INJECTION INTRAMUSCULAR; INTRAVENOUS at 07:43

## 2024-01-19 RX ADMIN — FENTANYL CITRATE 50 MCG: 50 INJECTION, SOLUTION INTRAMUSCULAR; INTRAVENOUS at 08:05

## 2024-01-19 RX ADMIN — ONDANSETRON 4 MG: 2 INJECTION INTRAMUSCULAR; INTRAVENOUS at 08:01

## 2024-01-19 RX ADMIN — PROPOFOL 50 MG: 10 INJECTION, EMULSION INTRAVENOUS at 08:35

## 2024-01-19 RX ADMIN — SODIUM CHLORIDE, SODIUM LACTATE, POTASSIUM CHLORIDE, AND CALCIUM CHLORIDE 100 ML/HR: .6; .31; .03; .02 INJECTION, SOLUTION INTRAVENOUS at 07:30

## 2024-01-19 RX ADMIN — PROPOFOL 150 MG: 10 INJECTION, EMULSION INTRAVENOUS at 07:48

## 2024-01-19 RX ADMIN — ROCURONIUM BROMIDE 50 MG: 50 INJECTION INTRAVENOUS at 07:49

## 2024-01-19 RX ADMIN — LIDOCAINE HYDROCHLORIDE 60 ML: 20 INJECTION, SOLUTION INFILTRATION; PERINEURAL at 07:48

## 2024-01-19 RX ADMIN — DEXAMETHASONE SODIUM PHOSPHATE 4 MG: 4 INJECTION, SOLUTION INTRAMUSCULAR; INTRAVENOUS at 08:01

## 2024-01-19 ASSESSMENT — PAIN - FUNCTIONAL ASSESSMENT
PAIN_FUNCTIONAL_ASSESSMENT: 0-10

## 2024-01-19 ASSESSMENT — COLUMBIA-SUICIDE SEVERITY RATING SCALE - C-SSRS
2. HAVE YOU ACTUALLY HAD ANY THOUGHTS OF KILLING YOURSELF?: NO
1. IN THE PAST MONTH, HAVE YOU WISHED YOU WERE DEAD OR WISHED YOU COULD GO TO SLEEP AND NOT WAKE UP?: NO
6. HAVE YOU EVER DONE ANYTHING, STARTED TO DO ANYTHING, OR PREPARED TO DO ANYTHING TO END YOUR LIFE?: NO

## 2024-01-19 ASSESSMENT — PAIN SCALES - GENERAL: PAINLEVEL_OUTOF10: 0 - NO PAIN

## 2024-01-19 NOTE — H&P
"History Of Present Illness  Nancy Dalton is a 48 y.o. female presenting with recurrent rectal polyp, R anterolateral position at dentate line.     Past Medical History  Past Medical History:   Diagnosis Date    Personal history of diseases of the blood and blood-forming organs and certain disorders involving the immune mechanism 02/11/2022    History of anemia       Surgical History  Past Surgical History:   Procedure Laterality Date    COLONOSCOPY      OTHER SURGICAL HISTORY  02/11/2022    Tonsillectomy    OTHER SURGICAL HISTORY  09/02/2022    Rectal polypectomy    UPPER GASTROINTESTINAL ENDOSCOPY      WISDOM TOOTH EXTRACTION          Social History  She reports that she has never smoked. She has never used smokeless tobacco. She reports current alcohol use of about 3.0 standard drinks of alcohol per week. She reports that she does not use drugs.    Family History  Family History   Problem Relation Name Age of Onset    Other (malignant neoplasm) Mother      Kidney disease Father          Allergies  Erythromycin and Penicillin         Physical Exam  Breathing comfortably  RRR  NAD     Last Recorded Vitals  Blood pressure 166/84, pulse 88, temperature 36.3 °C (97.3 °F), temperature source Temporal, resp. rate 16, height 1.803 m (5' 11\"), weight 67.2 kg (148 lb 2.4 oz), last menstrual period 01/12/2024, SpO2 100 %.    Relevant Results             Assessment/Plan   Principal Problem:    Rectal polyp      Plan for MARCELA Viramontes MD    "

## 2024-01-19 NOTE — ANESTHESIA PREPROCEDURE EVALUATION
Patient: Nancy Dalton    Procedure Information       Date/Time: 01/19/24 2430    Procedure: Excision Lesion Rectum - plan for transanal excision of rectal polyp in prone position    Location: [facility] OR 04 / Virtual [facility]    Surgeons: Gisell Viramontes MD            Relevant Problems   Anesthesia (within normal limits)      Cardiovascular (within normal limits)  > 4 mets        Endocrine (within normal limits)      GI (within normal limits)      /Renal (within normal limits)      Neuro/Psych (within normal limits)      Pulmonary (within normal limits)      GI/Hepatic (within normal limits)      Hematology   (+) Anemia      Musculoskeletal (within normal limits)      Eyes, Ears, Nose, and Throat (within normal limits)      Infectious Disease   (+) Lyme disease       Clinical information reviewed:   Tobacco  Allergies  Meds   Med Hx  Surg Hx   Fam Hx          NPO Detail:  NPO/Void Status  Date of Last Liquid: 01/18/24  Time of Last Liquid: 1930  Date of Last Solid: 01/18/24  Time of Last Solid: 1930         Physical Exam    Airway  Mallampati: II     Cardiovascular    Dental - normal exam  Comments: Perm retainer top front   Pulmonary    Abdominal        Anesthesia Plan    History of general anesthesia?: yes  History of complications of general anesthesia?: no    ASA 1     general     intravenous induction   Anesthetic plan and risks discussed with patient.    Plan discussed with CAA.

## 2024-01-19 NOTE — OP NOTE
Excision Lesion Rectum Operative Note     Date: 2024  OR Location: [facility] OR    Name: Nancy Dalton : 1975, Age: 48 y.o., MRN: [id number], Sex: female    Diagnosis  Pre-op Diagnosis     * Rectal polyp [K62.1] Post-op Diagnosis     * Rectal polyp [K62.1]     Procedures  Excision Lesion Rectum  11077 - AZ EXC RCT MACI INCL MUSCULARIS PROPRIA      Surgeons      * Gisell Viramontes - Primary    Resident/Fellow/Other Assistant:  Surgeon(s) and Role:    Procedure Summary  Anesthesia: General  ASA: I  Anesthesia Staff: Anesthesiologist: Placido Sebastian DO  CRNA: GUILHERME Nj-CRNA  Estimated Blood Loss: 1mL  Intra-op Medications:   Medication Name Total Dose   BUPivacaine-EPINEPHrine (Marcaine w/EPI) 0.5 %-1:200,000 injection 37 mL   sodium chloride 0.9 % irrigation solution 500 mL   lactated Ringer's infusion 125 mL              Anesthesia Record               Intraprocedure I/O Totals       None           Specimen:   ID Type Source Tests Collected by Time   1 : anal canal polyp Tissue SOFT TISSUE MASS RESECTION SURGICAL PATHOLOGY EXAM Gisell Viramontes MD 2024 0822   2 : anal canal polyp additional margin Tissue SOFT TISSUE MASS RESECTION SURGICAL PATHOLOGY EXAM Gisell Viramontes MD 2024        Staff:   Circulator: Juice Mackay RN  Scrub Person: Myrna Raines         Drains and/or Catheters: * None in log *    Tourniquet Times:         Implants:     Findings: 3mm right anterolateral polyp at dentate line    Indications: Nancy Dalton is an 48 y.o. female who is having surgery for Rectal polyp [K62.1].     The patient was seen in the preoperative area. The risks, benefits, complications, treatment options, non-operative alternatives, expected recovery and outcomes were discussed with the patient. The possibilities of reaction to medication, pulmonary aspiration, injury to surrounding structures, bleeding, recurrent infection, the need for additional  procedures, failure to diagnose a condition, and creating a complication requiring transfusion or operation were discussed with the patient. The patient concurred with the proposed plan, giving informed consent.  The site of surgery was properly noted/marked if necessary per policy. The patient has been actively warmed in preoperative area. Preoperative antibiotics are not indicated. Venous thrombosis prophylaxis have been ordered including bilateral sequential compression devices    Procedure Details:   Procedure:   Informed consent was obtained including discussion of risks, benefits, and alternatives. The patient was brought to the operating room and placed supine. Sequential compression devices were placed. Huddle was completed in accordance with hospital procedures. Anesthesia was induced and ETT was placed. The patient was placed in prone jacknife with all pressure points padded. The area was prepped and draped in the usual fashion. Time out was completed. Digital rectal exam was performed. Pudendal nerve block was performed with 1/4% marcaine with epinephrine. Intersphincteric block  was also performed. A submucosal injection in the area of the lesion was performed to help dissect the layers. A total of 30cc of local anesthesia was utilized.     The  polyp was in the R anterolateral position.  Scar was in anterior midline from previus procedure. An everting suture was placed to improve exposure. [place] Faustin retractor was placed. The rest of the anal canal was normal. The margins of resection were marked out and were at least 5mm from the edge of the polyp. Electrocautery was used to dissect in the submucosal plane, taking care not to include any sphincter muscle but to include mucosa and submucosa.   The remaining defect was similar to a hemorrhoidectomy defect and was closed in that way. A running 2=0 vicryl was used to reapproximate the mucosa, taking some deeper tissue to close the dead space.  The corner was intentionally left open to promote drainage.   The patient was then returned to supine. Anesthesia was weaned and the endotracheal tube was removed. The patient was transferred to PACU awake and in stable conditions. Counts were reported correct at the end of the procedure. I was present and scrubbed throughout.   Complications:  None; patient tolerated the procedure well.    Disposition: PACU - hemodynamically stable.  Condition: stable         Additional Details: none    Attending Attestation:     Gisell Viramontes  Phone Number: [phone]

## 2024-01-19 NOTE — ANESTHESIA PROCEDURE NOTES
Airway  Date/Time: 1/19/2024 7:52 AM  Urgency: elective    Airway not difficult    Staffing  Performed: CRNA   Authorized by: Placido Sebastian DO    Performed by: GUILHERME Nj-ZARINA  Patient location during procedure: OR    Indications and Patient Condition  Indications for airway management: anesthesia  Spontaneous Ventilation: absent  Sedation level: deep  Preoxygenated: yes  Patient position: sniffing  Mask difficulty assessment: 1 - vent by mask  Planned trial extubation    Final Airway Details  Final airway type: endotracheal airway      Successful airway: ETT  Cuffed: yes   Successful intubation technique: direct laryngoscopy  Facilitating devices/methods: intubating stylet  Endotracheal tube insertion site: oral  Blade: Rizwan  Blade size: #4  ETT size (mm): 7.0  Cormack-Lehane Classification: grade IIa - partial view of glottis  Placement verified by: chest auscultation, capnometry and palpation of cuff   Measured from: lips  ETT to lips (cm): 23  Number of attempts at approach: 1

## 2024-01-22 ASSESSMENT — PAIN SCALES - GENERAL: PAINLEVEL_OUTOF10: 2

## 2024-01-25 LAB
LABORATORY COMMENT REPORT: NORMAL
PATH REPORT.FINAL DX SPEC: NORMAL
PATH REPORT.GROSS SPEC: NORMAL
PATH REPORT.RELEVANT HX SPEC: NORMAL
PATH REPORT.TOTAL CANCER: NORMAL

## 2024-03-01 NOTE — PROGRESS NOTES
HPI    Nancy Dalton is a 48 y.o. female who previously underwent TAE of a rectal polyp with Dr. Currie on 3/8/2022. Path demonstrating TA. She most recently underwent colonoscopy on 9/29/23 with Dr. Hunter. There was evidence of a distal rectal polyp adjacent to the dentate line, biopsied. Path TA.     She is s/p EUA with excision of rectal polyp on 1/19/24. Path TA.     Colonoscopy 9/29/23 ([facility]): Complete to the cecum. There was evidence of a distal rectal polyp adjacent to the dentate line, biopsied. Path TA. There was evidence of scarring in the distal rectum from prior polypectomy. Internal hemorrhoids were noted.       Past Medical History:   Diagnosis Date    Personal history of diseases of the blood and blood-forming organs and certain disorders involving the immune mechanism 02/11/2022    History of anemia       Past Surgical History:   Procedure Laterality Date    COLONOSCOPY      OTHER SURGICAL HISTORY  02/11/2022    Tonsillectomy    OTHER SURGICAL HISTORY  09/02/2022    Rectal polypectomy    UPPER GASTROINTESTINAL ENDOSCOPY      WISDOM TOOTH EXTRACTION         Allergies:   Allergies   Allergen Reactions    Erythromycin Unknown    Penicillin Hives and Rash       Review of Systems   All other systems reviewed and are negative.        Physical Exam  Vitals and nursing note reviewed.   Constitutional:       Appearance: Normal appearance. She is normal weight.   Neurological:      Mental Status: She is alert.           Anoscopy    Date/Time: 3/5/2024 11:58 AM    Performed by: Gisell Viramontes MD  Authorized by: Gisell Viramontes MD    Consent:     Consent obtained:  Verbal    Consent given by:  Patient    Risks, benefits, and alternatives were discussed: yes      Risks discussed:  Bleeding and pain  Universal protocol:     Procedure explained and questions answered to patient or proxy's satisfaction: yes      Patient identity confirmed:  Verbally with patient  Indications:     Indications  comment:  Rectal polyp  Post-procedure details:     Procedure completion:  Tolerated well, no immediate complications      Assessment and Plan:       Healing well. Reviewed path. Small scar just above dentate R anterior.   Follow up for re-exam 4 months. Discussed small risk of recurrence.

## 2024-03-05 ENCOUNTER — OFFICE VISIT (OUTPATIENT)
Dept: SURGERY | Facility: CLINIC | Age: 49
End: 2024-03-05
Payer: COMMERCIAL

## 2024-03-05 VITALS
BODY MASS INDEX: 20.22 KG/M2 | WEIGHT: 145 LBS | DIASTOLIC BLOOD PRESSURE: 89 MMHG | HEART RATE: 91 BPM | SYSTOLIC BLOOD PRESSURE: 147 MMHG

## 2024-03-05 DIAGNOSIS — K62.1 RECTAL POLYP: Primary | ICD-10-CM

## 2024-03-05 PROCEDURE — 99213 OFFICE O/P EST LOW 20 MIN: CPT | Performed by: SURGERY

## 2024-03-05 PROCEDURE — 1036F TOBACCO NON-USER: CPT | Performed by: SURGERY

## 2024-03-05 PROCEDURE — 46600 DIAGNOSTIC ANOSCOPY SPX: CPT | Performed by: SURGERY

## 2024-07-08 NOTE — PROGRESS NOTES
HPI    Nancy Dalton is a 48 y.o. female who previously underwent TAE of a rectal polyp with Dr. Currie on 3/8/2022. Path demonstrating TA. She most recently underwent colonoscopy on 9/29/23 with Dr. Hunter. There was evidence of a distal rectal polyp adjacent to the dentate line, biopsied. Path TA.     She is s/p EUA with excision of rectal polyp on 1/19/24. Path TA. At LOV in March she had a small scar just above the dentate line in the right anterior.    Her twin sister was recently diagnosed with stage 4 colon cancer. She has been in and out of the hospital with an abscess and recently had a diverting stoma placed.     Colonoscopy 9/29/23 ([facility]): Complete to the cecum. There was evidence of a distal rectal polyp adjacent to the dentate line, biopsied. Path TA. There was evidence of scarring in the distal rectum from prior polypectomy. Internal hemorrhoids were noted.       Past Medical History:   Diagnosis Date    Personal history of diseases of the blood and blood-forming organs and certain disorders involving the immune mechanism 02/11/2022    History of anemia       Past Surgical History:   Procedure Laterality Date    COLONOSCOPY      OTHER SURGICAL HISTORY  02/11/2022    Tonsillectomy    OTHER SURGICAL HISTORY  09/02/2022    Rectal polypectomy    UPPER GASTROINTESTINAL ENDOSCOPY      WISDOM TOOTH EXTRACTION         Allergies:   Allergies   Allergen Reactions    Erythromycin Unknown    Penicillin Hives and Rash       Review of Systems   All other systems reviewed and are negative.        Physical Exam  Vitals and nursing note reviewed.   Constitutional:       Appearance: Normal appearance. She is normal weight.   Neurological:      Mental Status: She is alert.           Anoscopy    Date/Time: 7/12/2024 11:19 AM    Performed by: Gisell Viramontes MD  Authorized by: Gisell Viramontes MD    Consent:     Consent obtained:  Verbal    Consent given by:  Patient    Risks, benefits, and  alternatives were discussed: yes      Risks discussed:  Bleeding and pain  Universal protocol:     Procedure explained and questions answered to patient or proxy's satisfaction: yes      Patient identity confirmed:  Verbally with patient  Indications:     Indications comment:  Rectal polyp  Post-procedure details:     Procedure completion:  Tolerated well, no immediate complications      Assessment and Plan:     Normal exam today. Discussed her family history in detail. Twin sister now with stage IV CRC; she has a personal history of advanced adenoma. She is not sure her sister will make it to genetics but she would like to go herself.   Follow up with me 6 months for surveillance in office.

## 2024-07-12 ENCOUNTER — OFFICE VISIT (OUTPATIENT)
Dept: SURGERY | Facility: CLINIC | Age: 49
End: 2024-07-12
Payer: COMMERCIAL

## 2024-07-12 VITALS
SYSTOLIC BLOOD PRESSURE: 150 MMHG | BODY MASS INDEX: 20.08 KG/M2 | HEART RATE: 91 BPM | DIASTOLIC BLOOD PRESSURE: 95 MMHG | WEIGHT: 144 LBS

## 2024-07-12 DIAGNOSIS — K62.1 RECTAL POLYP: ICD-10-CM

## 2024-07-12 DIAGNOSIS — Z80.0 FAMILY HISTORY OF COLON CANCER: ICD-10-CM

## 2024-07-12 PROCEDURE — 46600 DIAGNOSTIC ANOSCOPY SPX: CPT | Performed by: SURGERY

## 2024-07-12 PROCEDURE — 99214 OFFICE O/P EST MOD 30 MIN: CPT | Mod: 24 | Performed by: SURGERY

## 2024-07-12 PROCEDURE — 99214 OFFICE O/P EST MOD 30 MIN: CPT | Performed by: SURGERY

## 2024-07-23 NOTE — PROGRESS NOTES
History of Present Illness:  Nancy Dalton is a 48 y.o. female with a family history of colon cancer  Nancy Dalton was referred to the Cancer Genetics Clinic at [facility] by Sekou Viramontes. Nancy Dalton is interested in genetic testing to clarify their personal risk for cancer, as well as the risks to their family members.    Cancer Medical History:  Personal history of cancer? 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 No     Cancer screening history:  Mammograms? Yes, most recent 22   Patient denies personal history of breast biopsy.   PAP smear? Yes, most recent 23 . No h/o abnormal paps  Colonoscopy? Yes:  - 10/5/23 (1 rectal polyp, tubular adenoma)  Total polyp count was 1-2  Upper endoscopy? Yes, most recent ~    Dermatology?  Follows annually    Other cancer screening? None    Reproductive History:  Number of children: 2  Number of pregnancies: 2  Age first birth: 28  Breast feeding? Yes , 6 mo  Menarche (age): 12  Menopause (age): 48  OCP: Yes , 3 years  HRT: No   Breast tissue: extremely dense   Hysterectomy? No   Oophorectomy? No     Family history:  A 4-generation pedigree was obtained and was significant for the following:   Fraternal twin sister (living, 50) with recently diagnosed colon cancer (stage IV, unsure whether she has had genetic testing or not). She also has a h/o melanoma at 34 and maybe some sort of female cancer but Ms. Dalton is unsure  Brother (living, 47) without cancer history  Mother (, 72) who passed of cholangiocarcinoma diagnosed at 72 (no genetic testing)  Maternal uncle (living, 80) with a h/o prostate cancer at 77 (unsure if had genetic testing)  Maternal aunt (, 72) who passed of liver cancer diagnosed at 70. (No genetic testing)  Maternal grandmother (, mid 80s) without cancer history  Maternal grandfather (, 53) without cancer history  Father (, 32) without  cancer history (passed d/t complications of kidney failure)  Paternal aunt (living, 77/78) without cancer history  Both paternal grandparents lived into their 70s without cancer history  Maternal ancestry is Kosovan.  Paternal ancestry is Italian. There is no known Ashkenazi Holiness ancestry. Consanguinity was denied.       Discussion:  Nancy Dalton is a 48 y.o. old female with a family history of cancer.  Based on having >=3 first-degree or second-degree relatives with Espino Syndrome-related cancers (such as colon and biliary tract cancers, bile and liver) regardless of age (who aren't available for their own testing), Nancy Dalton meets NCCN criteria for testing of the Espino syndrome genes. She is interested in testing, which is recommended, and was ordered today via the 71-gene panel from [employer] [facility] [employer]. Our discussion is summarized below.    We reviewed genes and chromosomes and inherited forms of colon and breast cancer. We discussed that most cancers are not due to an inherited genetic susceptibility. However, in about 5-10% of families, there is an inherited genetic mutation that can make a person more susceptible to developing certain forms of cancer. Within these families, we often see multiple family members with cancer, occurring in multiple generations. In addition, earlier onset cancers are suggestive of an inherited form of cancer. Finally, there is a clustering of certain types of cancer in these families.    We discussed Espino syndrome which is caused by germline mutations in one of five genes - MLH1, MSH2, MSH6, PMS2, and EPCAM. Individuals with Espino syndrome have increased risk to develop colon cancer over their lifetime, and an increased risk for a second colon primary. Women with Espino syndrome have an increased risk for endometrial cancer and ovarian cancer. Other cancers associated with Espino syndrome include gastric cancer, hepatobiliary, small bowel, urinary tract, and rarely  pancreatic cancer. Understanding if an individual has this condition results in changes to their medical management such as more frequent colonoscopies.    We discussed that there are multiple genes associated with increased cancer risk. Some genes, like the Espino Syndrome genes, are considered highly penetrant cancer genes, meaning a mutation in the gene confers a high risk of cancer. Additionally, there are other intermediate (moderate risk) cancer genes. For some of the moderate risk genes, there is often limited information regarding the degree to which a mutation in the gene affects risk of different types of cancers. Additionally, for some of these moderate risk genes, the appropriate management for individuals who have a mutation in one of these genes is not always clear. Our knowledge about the cancer risks associated with mutations in these moderate risk genes is always growing, and we will likely be able to provide more comprehensive information in the future.    Most cancer risk genes, like Espino syndrome genes, are inherited in an autosomal dominant fashion. This means that if an individual has a change in one of these genes, their siblings and children have a 50% chance of also having that gene change and a 50% chance of not having the gene change.    We reviewed the three results we can get back:  1. Positive- Identified a change in a cancer gene that confers an increased cancer risk. We will discuss potential changes in management for her and her family based on the specific gene mutation found.  2. Negative- Clears her for the cancer predisposition syndrome we assessed, but cannot clear her for all cancer predisposition risks. Along this line, we discussed that another member of the family could still have a hereditary predisposition that she did not happen to inherit (even if she comes back negative). For this reason, other members of her family may still wish to consider their own testing. We  discussed that technically it would be more informative for someone affected with cancer to be tested (as they would be the more likely person to have a hereditary cancer predisposition syndrome than someone unaffected by cancer). This would help to more accurately assess her risks and the family's cancer risks as a whole. That being said, if genetic testing is not feasible or easily done in another, affect family member, testing an unaffected individual can still be undergone.  3. Variant of Uncertain Significance (VUS)- We discussed should an uncertain result come back that this would be treated like a negative result (i.e. no management recommendation will be made no familial variant testing) as the implications of this finding are currently unknown.    Lastly, we discussed the Genetic Information Non-discrimination Act (TITI) of 2008. We discussed that per this federal law, employers (at companies with 15 employees or greater) and health insurance companies (barring  and other  insurances) are forbidden to ask for and use genetic information against another person. As such, health insurance companies cannot ask for genetic information and use findings affect coverage or rates. However, luxury insurances such as life insurance, long term care insurance, and/or private disability insurance companies are not forbidden against using genetic information when an individual takes out a new/additional policy in one of those areas. As such, for unaffected individuals it could be beneficial to explore/take out policies in luxury insurance areas PRIOR to undergoing genetic testing.      Nancy Dalton was counseled about hereditary cancer susceptibility including cancer risks, options for increased screening and/or risk reduction, genetic testing, and the implications for other family members. Nancy Dalton elected to move forward with genetic testing via a multi-gene panel.  The 71-gene panel from  [facility] [employer] was ordered.    Results are typically available within 4 weeks, and Nancy Dalton will return to the Cancer Genetics Clinic to discuss her testing results. At that time, we will make recommendations for both Nancy Dalton and her family members in terms of cancer screening and/or cancer risk reduction options.         PLAN:  1.  Nancy Dalton elected to undergo genetic testing via a panel test that analyzes 71 genes associated with colorectal and other cancer risks. Consent for testing was obtained verbally.  Nancy Dalton will go to a  laboratory to get her blood drawn using a DNA/RNA kit mailed to her home and an order has been placed.      2. Nancy Dalton will return to the genetics clinic via telephone in approximately 4 weeks to discuss her test results.    3. We remain available to Nancy Dalton or her family members at [phone] if any questions arise regarding information discussed at today's visit.    Tracey Gould MS, [facility]  Certified Genetic Counselor  [place] [facility]  [phone]    Reviewed by:  Dr. Alysia Evans  Clinical   [facility]  [phone]

## 2024-07-26 ENCOUNTER — TELEMEDICINE CLINICAL SUPPORT (OUTPATIENT)
Dept: GENETICS | Facility: CLINIC | Age: 49
End: 2024-07-26
Payer: COMMERCIAL

## 2024-07-26 DIAGNOSIS — K62.1 RECTAL POLYP: ICD-10-CM

## 2024-07-26 DIAGNOSIS — Z80.0 FAMILY HISTORY OF COLON CANCER: ICD-10-CM

## 2024-07-26 DIAGNOSIS — Z71.83 ENCOUNTER FOR NONPROCREATIVE GENETIC COUNSELING: Primary | ICD-10-CM

## 2024-07-26 PROCEDURE — 98968 PH1 ASSMT&MGMT NQHP 21-30: CPT

## 2024-08-16 ENCOUNTER — TELEPHONE (OUTPATIENT)
Dept: GENETICS | Facility: CLINIC | Age: 49
End: 2024-08-16
Payer: COMMERCIAL

## 2024-08-16 NOTE — TELEPHONE ENCOUNTER
Left voicemail for the patient regarding her outstanding sample submission for genetic testing ordered after meeting with her genetic counselor, Tracey Gould CGC. Requested patient call back to confirm that they planned on going forward with genetic testing and that they had received the kit for their blood draw. Provided my direct number: [phone]

## 2024-08-19 NOTE — TELEPHONE ENCOUNTER
Called patient again to clarify concerns about genetic testing. Clarified that genetic information could not be used against her for federal disability. Patient will contact genetic counselor once she makes her decision.

## 2024-08-19 NOTE — TELEPHONE ENCOUNTER
Patient called to return voicemail left on 8/16. Patient is unsure about going forward with genetic testing, and is still deciding. Patient is concerned about qualifying for disability later in life and how the results might impact that. She is also waiting on seeing the results from her sister, who currently has cancer. Patient will call back once she makes her decision.

## 2025-01-03 NOTE — PROGRESS NOTES
HPI    Nancy Dalton is a 49 y.o. female who previously underwent TAE of a rectal polyp with Dr. Currie on 3/8/2022. Path demonstrating TA. She most recently underwent colonoscopy on 9/29/23 with Dr. Hunter. There was evidence of a distal rectal polyp adjacent to the dentate line, biopsied. Path TA.     She is s/p EUA with excision of rectal polyp on 1/19/24. Path TA. At LOV she had a small scar just above the dentate line in the right anterior.    Colonoscopy 9/29/23 ([facility]): Complete to the cecum. There was evidence of a distal rectal polyp adjacent to the dentate line, biopsied. Path TA. There was evidence of scarring in the distal rectum from prior polypectomy. Internal hemorrhoids were noted.       Past Medical History:   Diagnosis Date    Personal history of diseases of the blood and blood-forming organs and certain disorders involving the immune mechanism 02/11/2022    History of anemia       Past Surgical History:   Procedure Laterality Date    COLONOSCOPY      OTHER SURGICAL HISTORY  02/11/2022    Tonsillectomy    OTHER SURGICAL HISTORY  09/02/2022    Rectal polypectomy    UPPER GASTROINTESTINAL ENDOSCOPY      WISDOM TOOTH EXTRACTION         Allergies:   Allergies   Allergen Reactions    Erythromycin Unknown    Penicillin Hives and Rash       Review of Systems   All other systems reviewed and are negative.        Physical Exam  Vitals and nursing note reviewed.   Constitutional:       Appearance: Normal appearance. She is normal weight.   Neurological:      Mental Status: She is alert.           Anoscopy    Date/Time: 1/10/2025 11:21 AM    Performed by: Gisell Viramontes MD  Authorized by: Gisell Viramontes MD    Consent:     Consent obtained:  Verbal    Consent given by:  Patient    Risks, benefits, and alternatives were discussed: yes      Risks discussed:  Bleeding and pain    Alternatives discussed:  No treatment  Universal protocol:     Procedure explained and questions answered to  patient or proxy's satisfaction: yes      Patient identity confirmed:  Verbally with patient  Indications:     Indications comment:  Rectal polyp  Post-procedure details:     Procedure completion:  Tolerated well, no immediate complications      Assessment and Plan:   Doing well. No regrowth  We discussed interval for colonoscopy screening. She decided not to pursue genetic testing at this time and her sister has not shared information regarding any testing she had (Stage IV CRC at age 50) but Ms Dalton says Espino was suspected  I recommended colonoscopy at 1-2 year intervals given all of the above.   Follow up with me in 6 months.

## 2025-01-10 ENCOUNTER — OFFICE VISIT (OUTPATIENT)
Dept: SURGERY | Facility: CLINIC | Age: 50
End: 2025-01-10
Payer: COMMERCIAL

## 2025-01-10 VITALS
HEART RATE: 123 BPM | SYSTOLIC BLOOD PRESSURE: 126 MMHG | BODY MASS INDEX: 22.45 KG/M2 | WEIGHT: 161 LBS | DIASTOLIC BLOOD PRESSURE: 85 MMHG

## 2025-01-10 DIAGNOSIS — K62.1 RECTAL POLYP: Primary | ICD-10-CM

## 2025-01-10 PROCEDURE — 46600 DIAGNOSTIC ANOSCOPY SPX: CPT | Performed by: SURGERY

## 2025-01-10 PROCEDURE — 99213 OFFICE O/P EST LOW 20 MIN: CPT | Mod: 24 | Performed by: SURGERY

## 2025-07-07 ENCOUNTER — OFFICE VISIT (OUTPATIENT)
Dept: SURGERY | Facility: CLINIC | Age: 50
End: 2025-07-07
Payer: COMMERCIAL

## 2025-07-07 VITALS
HEART RATE: 96 BPM | WEIGHT: 154 LBS | DIASTOLIC BLOOD PRESSURE: 90 MMHG | SYSTOLIC BLOOD PRESSURE: 150 MMHG | BODY MASS INDEX: 21.56 KG/M2 | HEIGHT: 71 IN | TEMPERATURE: 97.9 F

## 2025-07-07 DIAGNOSIS — Z80.0 FAMILY HISTORY OF COLON CANCER: ICD-10-CM

## 2025-07-07 DIAGNOSIS — K62.1 RECTAL POLYP: Primary | ICD-10-CM

## 2025-07-07 PROCEDURE — 46600 DIAGNOSTIC ANOSCOPY SPX: CPT | Performed by: NURSE PRACTITIONER

## 2025-07-07 PROCEDURE — 99213 OFFICE O/P EST LOW 20 MIN: CPT | Performed by: NURSE PRACTITIONER

## 2025-07-07 RX ORDER — BISMUTH SUBSALICYLATE 262 MG
1 TABLET,CHEWABLE ORAL DAILY
COMMUNITY

## 2025-07-07 NOTE — PROGRESS NOTES
History Of Present Illness  Nancy Dalton is a 49 y.o. female who previously underwent TAE of a rectal polyp with Dr. Currie on 3/8/2022. Path demonstrating TA. She most recently underwent colonoscopy on 9/29/23 with Dr. Hunter. There was evidence of a distal rectal polyp adjacent to the dentate line, biopsied. Path TA.      She is s/p EUA with excision of rectal polyp on 1/19/24. Path TA.     Her sister was dx with a HERT2 positive stage 4 colon cancer.    No c/o any constipation/diarrhea.  She takes a natural laxative a few times per week.  She will have 2 soft BM's every day.  No c/o any rectal bleeding.  No c/o any anal pain.      Past Medical History  She has a past medical history of Personal history of diseases of the blood and blood-forming organs and certain disorders involving the immune mechanism (02/11/2022).    Surgical History  She has a past surgical history that includes Other surgical history (02/11/2022); Other surgical history (09/02/2022); Colonoscopy; Upper gastrointestinal endoscopy; and Galt tooth extraction.     Social History  She reports that she has never smoked. She has never been exposed to tobacco smoke. She has never used smokeless tobacco. She reports current alcohol use of about 3.0 standard drinks of alcohol per week. She reports that she does not use drugs.    Family History  Family History[1]     Allergies  Banana, Erythromycin, Soy, and Penicillin    Review of Systems   All other systems reviewed and are negative.       Physical Exam  Constitutional:       Appearance: Normal appearance.   HENT:      Head: Normocephalic and atraumatic.   Pulmonary:      Effort: Pulmonary effort is normal.   Musculoskeletal:         General: Normal range of motion.   Skin:     General: Skin is warm and dry.   Neurological:      General: No focal deficit present.      Mental Status: She is alert and oriented to person, place, and time.   Psychiatric:         Mood and Affect: Mood normal.          Behavior: Behavior normal.          Anoscopy    Date/Time: 7/7/2025 10:42 AM    Performed by: CRISTA Prieto  Authorized by: CRISTA Prieto    Consent:     Consent obtained:  Verbal    Consent given by:  Patient    Risks, benefits, and alternatives were discussed: yes    Universal protocol:     Procedure explained and questions answered to patient or proxy's satisfaction: yes      Patient identity confirmed:  Verbally with patient  Post-procedure details:     Procedure completion:  Tolerated  Comments:      No anal lesions.  Slight weak tone on FRANCY, no pain.  You can feel a roughness to the tissue in the right anterior lateral position.  No lesions felt.  On anoscopy, looking in 360 degrees, she has mild irritation of the internal hemorrhoids.  No lesions seen.  No active bleeding.    Last Recorded Vitals  /90   Pulse 96   Temp 36.6 °C (97.9 °F)   Wt 69.9 kg (154 lb)        Assessment/Plan   Nancy will schedule to have her repeat colonoscopy in October.  She will call with any issues and will follow up with Dr. Devries 6 months after her colonoscopy       CRISTA Prieto         [1]   Family History  Problem Relation Name Age of Onset    Other (malignant neoplasm) Mother      Kidney disease Father

## 2025-07-11 ENCOUNTER — APPOINTMENT (OUTPATIENT)
Dept: SURGERY | Facility: CLINIC | Age: 50
End: 2025-07-11
Payer: COMMERCIAL

## (undated) DEVICE — SUTURE, VICRYL, 2-0, 27 IN, SH, UNDYED

## (undated) DEVICE — Device

## (undated) DEVICE — DRESSING, NON-ADHERENT, TELFA, OUCHLESS, 3 X 8 IN, STERILE

## (undated) DEVICE — TIP, SUCTION, YANKAUER, BULB, ADULT

## (undated) DEVICE — CONTAINER, SPECIMEN, 4 OZ, OR PEEL PACK, STERILE

## (undated) DEVICE — PAD, GROUNDING, ELECTROSURGICAL, W/9 FT CABLE, POLYHESIVE II, ADULT, LF

## (undated) DEVICE — SYRINGE, 60 CC, IRRIGATION, BULB, CONTRO-BULB, PAPER POUCH

## (undated) DEVICE — SPONGE, LAP, XRAY DECT, 18IN X 18IN, W/MASTER DMT, STERILE

## (undated) DEVICE — BRIEF, CURITY, XLARGE, MESH

## (undated) DEVICE — PREP TRAY, SKIN, DRY, W/GLOVES

## (undated) DEVICE — APPLICATOR, COTTON TIP, 6 IN, 2PK, STERILE

## (undated) DEVICE — GLOVE, SURGICAL, SENSICARE, SLT, SZ-7.0, PF, LF

## (undated) DEVICE — DRAPE PACK, GYNECOLOGY, LAPAROSCOPIC/PELVISCOPY, W/LEGGINGS, W/ABDOMINAL/PERINEAL FENESTRATION, DISPOSABLE, STERILE

## (undated) DEVICE — DRESSING, ABDOMINAL, TENDERSORB, 8 X 7-1/2 IN, STERILE